# Patient Record
Sex: FEMALE | Race: WHITE | NOT HISPANIC OR LATINO
[De-identification: names, ages, dates, MRNs, and addresses within clinical notes are randomized per-mention and may not be internally consistent; named-entity substitution may affect disease eponyms.]

---

## 2017-07-12 ENCOUNTER — RESULT REVIEW (OUTPATIENT)
Age: 37
End: 2017-07-12

## 2018-11-01 ENCOUNTER — RESULT REVIEW (OUTPATIENT)
Age: 38
End: 2018-11-01

## 2020-10-20 ENCOUNTER — RESULT REVIEW (OUTPATIENT)
Age: 40
End: 2020-10-20

## 2024-03-04 ENCOUNTER — APPOINTMENT (OUTPATIENT)
Dept: OBGYN | Facility: CLINIC | Age: 44
End: 2024-03-04
Payer: COMMERCIAL

## 2024-03-04 VITALS — WEIGHT: 125 LBS | BODY MASS INDEX: 23.6 KG/M2 | HEIGHT: 61 IN

## 2024-03-04 DIAGNOSIS — N93.0 POSTCOITAL AND CONTACT BLEEDING: ICD-10-CM

## 2024-03-04 DIAGNOSIS — Z83.3 FAMILY HISTORY OF DIABETES MELLITUS: ICD-10-CM

## 2024-03-04 DIAGNOSIS — N84.0 POLYP OF CORPUS UTERI: ICD-10-CM

## 2024-03-04 DIAGNOSIS — N93.9 ABNORMAL UTERINE AND VAGINAL BLEEDING, UNSPECIFIED: ICD-10-CM

## 2024-03-04 DIAGNOSIS — Z83.42 FAMILY HISTORY OF FAMILIAL HYPERCHOLESTEROLEMIA: ICD-10-CM

## 2024-03-04 DIAGNOSIS — Z00.00 ENCOUNTER FOR GENERAL ADULT MEDICAL EXAMINATION W/OUT ABNORMAL FINDINGS: ICD-10-CM

## 2024-03-04 DIAGNOSIS — N92.0 EXCESSIVE AND FREQUENT MENSTRUATION WITH REGULAR CYCLE: ICD-10-CM

## 2024-03-04 DIAGNOSIS — N92.6 IRREGULAR MENSTRUATION, UNSPECIFIED: ICD-10-CM

## 2024-03-04 DIAGNOSIS — Z78.9 OTHER SPECIFIED HEALTH STATUS: ICD-10-CM

## 2024-03-04 DIAGNOSIS — Z80.1 FAMILY HISTORY OF MALIGNANT NEOPLASM OF TRACHEA, BRONCHUS AND LUNG: ICD-10-CM

## 2024-03-04 DIAGNOSIS — Z86.39 PERSONAL HISTORY OF OTHER ENDOCRINE, NUTRITIONAL AND METABOLIC DISEASE: ICD-10-CM

## 2024-03-04 PROCEDURE — 99204 OFFICE O/P NEW MOD 45 MIN: CPT

## 2024-03-04 NOTE — HISTORY OF PRESENT ILLNESS
[Patient reported mammogram was normal] : Patient reported mammogram was normal [Patient reported breast sonogram was normal] : Patient reported breast sonogram was normal [Patient reported PAP Smear was normal] : Patient reported PAP Smear was normal [FreeTextEntry1] : 43YO  LMP 2/10, menses q24-28d, some heavier than others and spots in between menses, not too painful. She has a Hx of endometrial polyp twice in the past. [BreastSonogramDate] : 3/23 [Mammogramdate] : 3/23 [PapSmeardate] : 3/23

## 2024-03-04 NOTE — PLAN
[FreeTextEntry1] : menomet and irreg cycles with Hx endometrial polyp Will likely require EmBx after U/S

## 2024-03-04 NOTE — PHYSICAL EXAM
[Chaperone Present] : A chaperone was present in the examining room during all aspects of the physical examination [Alert] : alert [Appropriately responsive] : appropriately responsive [No Acute Distress] : no acute distress [No Lymphadenopathy] : no lymphadenopathy [Regular Rate Rhythm] : regular rate rhythm [No Murmurs] : no murmurs [Clear to Auscultation B/L] : clear to auscultation bilaterally [Soft] : soft [Non-tender] : non-tender [No HSM] : No HSM [Non-distended] : non-distended [No Lesions] : no lesions [No Mass] : no mass [Oriented x3] : oriented x3 [Examination Of The Breasts] : a normal appearance [No Masses] : no breast masses were palpable [Labia Majora] : normal [Labia Minora] : normal [No Bleeding] : There was no active vaginal bleeding [Pink Rugae] : pink rugae [Normal] : normal [Normal Position] : in a normal position [Uterine Adnexae] : normal [Tenderness] : nontender

## 2024-03-05 LAB
HPV HIGH+LOW RISK DNA PNL CVX: NOT DETECTED
TSH SERPL-ACNC: 3.71 UIU/ML

## 2024-03-08 LAB — CYTOLOGY CVX/VAG DOC THIN PREP: NORMAL

## 2024-04-03 RX ORDER — NORETHINDRONE ACETATE 5 MG/1
5 TABLET ORAL
Qty: 40 | Refills: 0 | Status: ACTIVE | COMMUNITY
Start: 2024-04-03 | End: 1900-01-01

## 2024-11-19 ENCOUNTER — TRANSCRIPTION ENCOUNTER (OUTPATIENT)
Age: 44
End: 2024-11-19

## 2024-11-19 ENCOUNTER — APPOINTMENT (OUTPATIENT)
Dept: OBGYN | Facility: CLINIC | Age: 44
End: 2024-11-19
Payer: COMMERCIAL

## 2024-11-19 PROCEDURE — 99213 OFFICE O/P EST LOW 20 MIN: CPT | Mod: 25

## 2024-11-19 PROCEDURE — 76830 TRANSVAGINAL US NON-OB: CPT

## 2024-11-19 PROCEDURE — 76856 US EXAM PELVIC COMPLETE: CPT

## 2024-11-19 PROCEDURE — 36415 COLL VENOUS BLD VENIPUNCTURE: CPT

## 2024-11-26 ENCOUNTER — OUTPATIENT (OUTPATIENT)
Dept: OUTPATIENT SERVICES | Facility: HOSPITAL | Age: 44
LOS: 1 days | End: 2024-11-26
Payer: COMMERCIAL

## 2024-11-26 VITALS
TEMPERATURE: 99 F | RESPIRATION RATE: 18 BRPM | HEART RATE: 67 BPM | WEIGHT: 128.09 LBS | HEIGHT: 62 IN | DIASTOLIC BLOOD PRESSURE: 55 MMHG | OXYGEN SATURATION: 100 % | SYSTOLIC BLOOD PRESSURE: 97 MMHG

## 2024-11-26 DIAGNOSIS — Z01.818 ENCOUNTER FOR OTHER PREPROCEDURAL EXAMINATION: ICD-10-CM

## 2024-11-26 DIAGNOSIS — N92.0 EXCESSIVE AND FREQUENT MENSTRUATION WITH REGULAR CYCLE: ICD-10-CM

## 2024-11-26 DIAGNOSIS — N84.0 POLYP OF CORPUS UTERI: ICD-10-CM

## 2024-11-26 LAB
BLD GP AB SCN SERPL QL: NEGATIVE — SIGNIFICANT CHANGE UP
HCT VFR BLD CALC: 39.2 % — SIGNIFICANT CHANGE UP (ref 34.5–45)
HGB BLD-MCNC: 12.7 G/DL — SIGNIFICANT CHANGE UP (ref 11.5–15.5)
MCHC RBC-ENTMCNC: 27.8 PG — SIGNIFICANT CHANGE UP (ref 27–34)
MCHC RBC-ENTMCNC: 32.4 G/DL — SIGNIFICANT CHANGE UP (ref 32–36)
MCV RBC AUTO: 85.8 FL — SIGNIFICANT CHANGE UP (ref 80–100)
NRBC # BLD: 0 /100 WBCS — SIGNIFICANT CHANGE UP (ref 0–0)
PLATELET # BLD AUTO: 184 K/UL — SIGNIFICANT CHANGE UP (ref 150–400)
RBC # BLD: 4.57 M/UL — SIGNIFICANT CHANGE UP (ref 3.8–5.2)
RBC # FLD: 12.5 % — SIGNIFICANT CHANGE UP (ref 10.3–14.5)
RH IG SCN BLD-IMP: POSITIVE — SIGNIFICANT CHANGE UP
WBC # BLD: 7.53 K/UL — SIGNIFICANT CHANGE UP (ref 3.8–10.5)
WBC # FLD AUTO: 7.53 K/UL — SIGNIFICANT CHANGE UP (ref 3.8–10.5)

## 2024-11-26 PROCEDURE — G0463: CPT

## 2024-11-26 PROCEDURE — 86900 BLOOD TYPING SEROLOGIC ABO: CPT

## 2024-11-26 PROCEDURE — 86850 RBC ANTIBODY SCREEN: CPT

## 2024-11-26 PROCEDURE — 85027 COMPLETE CBC AUTOMATED: CPT

## 2024-11-26 PROCEDURE — 86901 BLOOD TYPING SEROLOGIC RH(D): CPT

## 2024-11-26 RX ORDER — 0.9 % SODIUM CHLORIDE 0.9 %
1000 INTRAVENOUS SOLUTION INTRAVENOUS
Refills: 0 | Status: DISCONTINUED | OUTPATIENT
Start: 2024-12-11 | End: 2024-12-25

## 2024-11-26 RX ORDER — SODIUM CHLORIDE 9 MG/ML
3 INJECTION, SOLUTION INTRAMUSCULAR; INTRAVENOUS; SUBCUTANEOUS EVERY 8 HOURS
Refills: 0 | Status: DISCONTINUED | OUTPATIENT
Start: 2024-12-11 | End: 2024-12-25

## 2024-11-26 NOTE — H&P PST ADULT - HEMATOLOGY/LYMPHATICS
Cardiology Clinic Note: Anirudh Richards MD    Date of Service: 6/28/2024      HPI:   Angeli Perry is a 47 year old female with past medical history significant for significant family history of CAD, elevated coronary artery calcium score and familial hypercholesterolemia  that comes for cardiac follow up.    Patient reports that since prior visit she has been feeling in her usual state of health. She denies any episode of chest pain, shortness of breath, palpitations, syncope or presyncope, lightheadedness or dizziness.  Denies orthopnea or lower extremity edema.  She is able to do all her activities of daily living without limitations.  She exercises regularly up to 3-4 times a week for 45 minutes daily. She reports compliance with all of her medications without side effects.   She reports has been struggling with weight management. She lost about 50 lbs with Ozempic, but has started to re-gain weight again. She is also experiencing some perimenopause symptoms which are being managed by her gynecologist. She was put on amlodipine 5 mg daily by her PCP for elevated BP while taking estrogen therapy. BP now is better control when she stopped taking birth control pills.       Cardiac and Relevant Medical History  Specialty Problems          Cardiology Problems    Familial hypercholesterolemia        Elevated coronary artery calcium score        Primary hypertension           Review of Systems   Review of Systems   Constitutional: Negative.  Negative for activity change, fatigue and fever.   HENT: Negative.  Negative for congestion, ear pain, rhinorrhea, sore throat and trouble swallowing.    Eyes: Negative.  Negative for visual disturbance.   Respiratory: Negative.  Negative for cough, shortness of breath and wheezing.    Cardiovascular: Negative.  Negative for chest pain, palpitations and leg swelling.   Gastrointestinal: Negative.  Negative for abdominal pain, diarrhea, nausea and vomiting.   Endocrine: Negative.     Genitourinary: Negative.  Negative for dysuria.   Musculoskeletal: Negative.  Negative for arthralgias and myalgias.   Skin: Negative.  Negative for color change and rash.   Allergic/Immunologic: Negative.    Neurological: Negative.  Negative for dizziness, syncope, weakness, numbness and headaches.   Hematological: Negative.    Psychiatric/Behavioral: Negative.         Past Medical History:   Diagnosis Date    Arthritis unknown    elbow, right knee    Atypical squamous cell changes of undetermined significance (ASCUS) on cervical cytology with negative high risk human papilloma virus (HPV) test result 2013    had normal bx- 2013    Carpal tunnel syndrome on both sides     CIDP (chronic inflammatory demyelinating polyneuropathy)  (CMD)     Colon polyp     tubular adenoma    Dermatitis 02/22/2016    right hand- remitted in 2016    Ectopic pregnancy (CMD) 2004    Genital herpes     GERD (gastroesophageal reflux disease) Unk    Hypercholesteremia     Neuromuscular disorder  (CMD) 03/2012    CIDP    Obesity (BMI 30-39.9) 09/04/2020    Rash of neck 10/18/2021       Past Surgical History:   Procedure Laterality Date    Carpal tunnel release Left 01/04/2024    Colonoscopy w biopsy  04/25/2023    tubular adenoma, repeat 1 year due to suboptimal prep    Ectopic pregnancy surgery Left 2012    Laparoscopic Excision Of Ectopic Preg & Salpingectomy     Esophagogastroduodenoscopy  2021    Hysteroscopy      Hysteroscopy W/ Insert Of Device To Occlude Fallopian Tubes    Ovary surgery  05/2010    Oviductal Surgery Tubal Occlusion By Device    Pelvic laparoscopy      ectopic    Perineorrhaphy      perineorrhaphy and sacrospinous ligament fixation    Rectal surgery  2010    Excision Of Rectal Procidentia Perineal Approach    Repair of rectocele      Tubal ligation      adiana       Family History   Problem Relation Age of Onset    Osteoporosis Mother 57    Hypertension Mother     High blood pressure Mother     Hyperlipidemia Mother      Colon Polyps Mother     Myocardial Infarction Father     COPD Father     Diabetes Father     Other Father         Pulmonary Hypertension    High blood pressure Father     Hyperlipidemia Father     Patient is unaware of any medical problems Brother     Cancer, Breast Maternal Aunt 76    Cancer, Breast Paternal Aunt 59    Cancer, Throat Maternal Grandmother     Cancer, Colon Neg Hx     Cancer, Ovarian Neg Hx     Clotting Disorder Neg Hx     Cancer, Endometrial Neg Hx        Social History     Tobacco Use    Smoking status: Never     Passive exposure: Never    Smokeless tobacco: Never   Vaping Use    Vaping status: never used   Substance Use Topics    Alcohol use: Yes     Comment: Socially, 1-2x/month    Drug use: No       ALLERGIES:  No Known Allergies    Current Outpatient Medications   Medication Sig Dispense Refill    diclofenac (VOLTAREN) 50 MG EC tablet Take 50 mg by mouth in the morning and 50 mg in the evening.      methylPREDNISolone (MEDROL DOSEPAK) 4 MG tablet TAKE 6 TABLETS ON DAY 1 AS DIRECTED ON PACKAGE AND DECREASE BY 1 TAB EACH DAY FOR A TOTAL OF 6 DAYS      amLODIPine (NORVASC) 5 MG tablet TAKE 1 TABLET BY MOUTH EVERY DAY 90 tablet 1    triamcinolone (ARISTOCORT) 0.1 % cream Apply 1 Application topically as needed (psoriasis).      clobetasol (TEMOVATE) 0.05 % topical solution Apply 1 Application topically as needed.      Ozempic, 0.25 or 0.5 MG/DOSE, 2 MG/3ML Solution Pen-injector Inject 0.25 mg into the skin every 7 weeks.      CALCIUM CITRATE-VITAMIN D PO       atorvastatin (LIPITOR) 80 MG tablet TAKE 1 TABLET BY MOUTH EVERY DAY 90 tablet 3    gabapentin (NEURONTIN) 400 MG capsule Take 1 capsule by mouth in the morning and 1 capsule in the evening. 180 capsule 3    ibuprofen (MOTRIN) 600 MG tablet Take 1 tablet by mouth every 6 hours as needed.      cetirizine (ZyrTEC) 10 MG tablet Take 10 mg by mouth daily.      Immune Globulin, Human, (IMMUNE GLOBULIN, SUCROSE FREE,) 5 GM/50ML Solution Give  35gm for two days every 8 weeks. (Patient taking differently: Give 35gm for two days every 10 weeks.) 350 mL 11    Multiple Vitamins-Minerals (MULTIVITAMIN ADULT PO)       acetaminophen (TYLENOL) 325 MG tablet Take 625mg of tylenol prior to monthly IVIG infusion. 2 tablet 11    Fiber, Guar Gum, Chew Tab       fluticasone (FLONASE) 50 MCG/ACT nasal spray        No current facility-administered medications for this visit.         Objective:    Physical Exam:   Visit Vitals  BP 97/64 (BP Location: LUE - Left upper extremity, Patient Position: Sitting, Cuff Size: Regular) Comment (Cuff Size): Long   Pulse 75   Ht 5' 8\" (1.727 m)   Wt 86.7 kg (191 lb 4 oz)   LMP 05/24/2024 (Exact Date)   SpO2 99%   BMI 29.08 kg/m²         Wt Readings from Last 4 Encounters:   06/28/24 86.7 kg (191 lb 4 oz)   06/13/24 86.4 kg (190 lb 7.6 oz)   06/12/24 86.4 kg (190 lb 9.4 oz)   06/10/24 86.6 kg (191 lb)         Physical Exam   Constitutional: She appears healthy. No distress.   Vital signs reviewed   HENT:   Mouth/Throat: Oropharynx is clear.   Eyes: Conjunctivae are normal.   Neck: Thyroid normal.   Cardiovascular: Normal rate, regular rhythm, S1 normal, S2 normal, normal heart sounds, intact distal pulses and normal pulses.   Pulmonary/Chest: Effort normal and breath sounds normal. She exhibits no tenderness.   Abdominal: Soft. Bowel sounds are normal.   Musculoskeletal:         General: Normal range of motion.      Cervical back: Normal range of motion and neck supple.   Neurological: She is alert and oriented to person, place, and time. She has normal motor skills. Gait normal.   Skin: Skin is warm and dry. No rash noted. No cyanosis. Nails show no clubbing.         Labs:  Recent Labs   Lab 06/05/24  0848 02/26/24  0810   Cholesterol 155 177   HDL 53 53   Triglycerides 132 116   CALCLDL 76 101   Non-HDL Cholesterol 102 124       Recent Labs   Lab 06/05/24  0848 02/26/24  0810   Sodium 141 140   Chloride 107 109   BUN 10 11   Potassium  4.1 4.1   Glucose 98 118*   Creatinine 0.72 0.63   Calcium 9.2 9.1       No results for input(s): \"WBC\", \"RBC\", \"HGB\", \"HCT\", \"MCV\", \"MCHC\", \"RDWCV\", \"PLT\", \"TLYMPH\" in the last 8765 hours.    Recent Labs   Lab 02/26/24  0810   GPT 22        No results for input(s): \"NTPROB\" in the last 8765 hours.    The 10-year ASCVD risk score (Rebecca LITTLEJOHN, et al., 2019) is: 0.5%    Values used to calculate the score:      Age: 47 years      Sex: Female      Is Non- : No      Diabetic: No      Tobacco smoker: No      Systolic Blood Pressure: 97 mmHg      Is BP treated: Yes      HDL Cholesterol: 53 mg/dL      Total Cholesterol: 155 mg/dL  Imaging:  No results found.  No results found for this or any previous visit.    No results found for this or any previous visit.    No results found for this or any previous visit.    No results found for this or any previous visit.        Assessment    Elevated coronary artery calcium score  EKG in the office at previous visit was normal.  Today 6/28/24 ECG shows NSR with no ischemic changes.   1/28/2019 coronary calcium score 19; RCA 18, other branch 1.  Although a score of 19 does not suggest that she has obstructive CAD, the mere presence of coronary calcium of this degree at her age suggests increased risk as she is at the 90th percentile.  She needs aggressive risk factor modification, particularly in the context of a very strong family history.    -LDL should be aggressively targeted to 60 and blood pressure should be maintained meticulously below 130/80.  97/64 in the office today  Continue high intensity statin and amlodipine 5 mg daily.   If patient is to start in HRT by gynecologist, suggest checking lipid panel every 6 months, and if LDL not at goal, could consider adding Zetia to her regimen.         Familial hypercholesterolemia  11/5/2022  TG 88 HDL 40 LDL 89  2/18/2023   HDL 51 LDL 62  6/5/24   HDL 53 LDL 76  Continue  atorvastatin at 80 mg as her LDL appears well controlled and close to goal.   If patient is to start in HRT by gynecologist, suggest checking lipid panel every 6 months, and if LDL not at goal, could consider adding Zetia to her regimen.      Prediabetes  On metformin and Ozempic.     Obesity (BMI 30-39.9)  She has lost a significant amount of weight and has improved her BMI significantly. I encouraged her to maintain intensive lifestyle modification.  This includes diet, exercise, and weight control.     Orthostatic hypotension  -The patient complained of what sounds like classic orthostatic hypotension when going from seated to laying down to standing.  -Counseled on counter maneuvers to combat this     Chronic inflammatory demyelinating neuropathy   -Undergoing IVIG infusions        Orders Placed This Encounter    Comprehensive Metabolic Panel    Lipid Panel With Reflex    Electrocardiogram 12-Lead       Recommendations  1. Continue current medication regimen.  2. Encourage lifestyle modifications including regular exercise, heart healthy diet, weight loss.   3. Check lipid panel at least twice a year if patient is started in HRT.   4. Follow up in cardiology clinic in 1 year.     Discussed with attending physician Dr. Susie Garcia MD  PGY-5 Cardiology Fellow   [facility]  Please contact via [email or web address]    Attending: Patient seen and examined.  Data above is entirely accurate.  I agree.  My recommendations are incorporated above.  Patient appears very well compensated and has no specific cardiac complaints.  Continue with lifestyle modification recommendation as outlined above.  I will continue to see her annually.    Greater than 50% of the time spent was counseling the patient on the above problems.  Total time spent was 35 minutes.    Anirudh Richards MD     negative

## 2024-11-26 NOTE — H&P PST ADULT - ASSESSMENT
DASI Score:8  DASI Activity:  Cardio 4-5x weekly    able to go up one flight of stairs or walk 1-2 blocks without difficulty  Loose or removable teeth: denies

## 2024-11-26 NOTE — H&P PST ADULT - NSICDXPASTSURGICALHX_GEN_ALL_CORE_FT
PAST SURGICAL HISTORY:   delivery delivered X 2    S/P dilatation and curettage to remove endometrial polyp 2006    S/P dilation and curettage 2011

## 2024-11-26 NOTE — H&P PST ADULT - HISTORY OF PRESENT ILLNESS
This is a 44 year old female        Presenting to PST for scheduled D&C, hysteroscopy w/ myosure, resection of endometrial polyp w/ anshul on 12/11/24 with Dr. Keita.  This is a 44 year old female with no significant past medical history, not taking any medications. Reports having years of heavy, painful menses. Presenting to [facility] for scheduled D&C, hysteroscopy w/ myosure, resection of endometrial polyp w/ anshul on 12/11/24 with Dr. Keita.

## 2024-11-26 NOTE — H&P PST ADULT - PROBLEM SELECTOR PLAN 1
Preop instructions given and pt verbalized understanding  Labs CBC T&S performed in PST  ABO  and UCG DOS

## 2024-12-11 ENCOUNTER — APPOINTMENT (OUTPATIENT)
Dept: OBGYN | Facility: HOSPITAL | Age: 44
End: 2024-12-11

## 2024-12-11 ENCOUNTER — OUTPATIENT (OUTPATIENT)
Dept: OUTPATIENT SERVICES | Facility: HOSPITAL | Age: 44
LOS: 1 days | End: 2024-12-11
Payer: COMMERCIAL

## 2024-12-11 ENCOUNTER — TRANSCRIPTION ENCOUNTER (OUTPATIENT)
Age: 44
End: 2024-12-11

## 2024-12-11 VITALS
OXYGEN SATURATION: 99 % | SYSTOLIC BLOOD PRESSURE: 101 MMHG | DIASTOLIC BLOOD PRESSURE: 56 MMHG | RESPIRATION RATE: 18 BRPM | HEART RATE: 62 BPM

## 2024-12-11 VITALS
WEIGHT: 128.09 LBS | HEART RATE: 60 BPM | RESPIRATION RATE: 15 BRPM | HEIGHT: 62 IN | DIASTOLIC BLOOD PRESSURE: 58 MMHG | OXYGEN SATURATION: 100 % | SYSTOLIC BLOOD PRESSURE: 90 MMHG | TEMPERATURE: 97 F

## 2024-12-11 DIAGNOSIS — N92.0 EXCESSIVE AND FREQUENT MENSTRUATION WITH REGULAR CYCLE: ICD-10-CM

## 2024-12-11 DIAGNOSIS — N84.0 POLYP OF CORPUS UTERI: ICD-10-CM

## 2024-12-11 PROCEDURE — 58563 HYSTEROSCOPY ABLATION: CPT

## 2024-12-11 PROCEDURE — C1889: CPT

## 2024-12-11 PROCEDURE — C1782: CPT

## 2024-12-11 PROCEDURE — 88305 TISSUE EXAM BY PATHOLOGIST: CPT | Mod: 26

## 2024-12-11 PROCEDURE — 58353 ENDOMETR ABLATE THERMAL: CPT

## 2024-12-11 PROCEDURE — 88305 TISSUE EXAM BY PATHOLOGIST: CPT

## 2024-12-11 DEVICE — NOVASURE V5: Type: IMPLANTABLE DEVICE | Status: FUNCTIONAL

## 2024-12-11 DEVICE — MYOSURE TISSUE REMOVAL DEVICE REACH: Type: IMPLANTABLE DEVICE | Status: FUNCTIONAL

## 2024-12-11 RX ORDER — LIDOCAINE HCL 20 MG/ML
0.2 VIAL (ML) INJECTION ONCE
Refills: 0 | Status: COMPLETED | OUTPATIENT
Start: 2024-12-11 | End: 2024-12-11

## 2024-12-11 RX ORDER — FENTANYL 12 UG/H
25 PATCH, EXTENDED RELEASE TRANSDERMAL
Refills: 0 | Status: DISCONTINUED | OUTPATIENT
Start: 2024-12-11 | End: 2024-12-11

## 2024-12-11 RX ORDER — ONDANSETRON HYDROCHLORIDE 4 MG/1
4 TABLET, FILM COATED ORAL ONCE
Refills: 0 | Status: DISCONTINUED | OUTPATIENT
Start: 2024-12-11 | End: 2024-12-25

## 2024-12-11 RX ADMIN — SODIUM CHLORIDE 3 MILLILITER(S): 9 INJECTION, SOLUTION INTRAMUSCULAR; INTRAVENOUS; SUBCUTANEOUS at 09:29

## 2024-12-11 RX ADMIN — Medication 100 MILLILITER(S): at 09:30

## 2024-12-11 NOTE — PACU DISCHARGE NOTE - NS MD DISCHARGE NOTE DISCHARGE
RX clarified and resent to pharm
Home
Pt demonstrated decreased strength, balance, ROM, vision and cognition and limited by pain impacting pt's ability to participate in functional mobility and ADLs.

## 2024-12-11 NOTE — BRIEF OPERATIVE NOTE - NSICDXBRIEFPREOP_GEN_ALL_CORE_FT
PRE-OP DIAGNOSIS:  Menometrorrhagia 11-Dec-2024 11:54:40  Pb Keita  Endometrial polyp 11-Dec-2024 11:54:49  Pb Keita

## 2024-12-11 NOTE — BRIEF OPERATIVE NOTE - NSICDXBRIEFPROCEDURE_GEN_ALL_CORE_FT
PROCEDURES:  Polypectomy, hysteroscopic, uterus 11-Dec-2024 11:54:10  Pb Keita  D&C (dilatation and curettage, scraping of uterus) 11-Dec-2024 11:54:21  Pb Keita  NovaSure endometrial ablation 11-Dec-2024 11:54:27  Pb Keita

## 2024-12-11 NOTE — ASU DISCHARGE PLAN (ADULT/PEDIATRIC) - NURSING INSTRUCTIONS
You can take Tylenol (Acetaminophen) every 6 hours and Motrin (Ibuprofen) every 6 hours, as needed. You may alternate these medications such that you take one or the other every 3 hours for around the clock pain coverage. Do not exceed 4000mg of Tylenol (Acetaminophen) daily. The next time you can take Tylenol and/or Ibuprofen is at 530PM, if needed for pain and no contraindications.

## 2024-12-11 NOTE — ASU PREOP CHECKLIST - ANTIBIOTIC
Is This A New Presentation, Or A Follow-Up?: Skin Lesions How Severe Is Your Skin Lesion?: mild Have Your Skin Lesions Been Treated?: not been treated n/a

## 2024-12-11 NOTE — ASU DISCHARGE PLAN (ADULT/PEDIATRIC) - CARE PROVIDER_API CALL
Pb Keita  Obstetrics and Gynecology  [address] [place] [address], [address]  [place] [address]  Phone: [phone]  Fax: [phone]  Follow Up Time:

## 2024-12-11 NOTE — ASU DISCHARGE PLAN (ADULT/PEDIATRIC) - FINANCIAL ASSISTANCE
[facility] provides services at a reduced cost to those who are determined to be eligible through [facility]’s financial assistance program. Information regarding [facility]’s financial assistance program can be found by going to [email or web address] or by calling [phone].

## 2024-12-11 NOTE — BRIEF OPERATIVE NOTE - NSICDXBRIEFPOSTOP_GEN_ALL_CORE_FT
POST-OP DIAGNOSIS:  Menometrorrhagia 11-Dec-2024 11:54:59  Pb Keita  Endometrial polyp 11-Dec-2024 11:55:08  Pb Keita

## 2024-12-11 NOTE — ASU PREOP CHECKLIST - BOWEL PREP
CMS intact to lower extremities  Dressing site of _mid back____ C/D/I  Report to given to __Jim__ recovery RN  Procedure tolerated well and in stable condition to recovery room   
Procedure to be done Bilateral thoracic cooled radiofrequency ablation  See printout for procedural vitals  Grounding pad applied to lower leg. Skin dry and intact    
n/a

## 2024-12-13 LAB — SURGICAL PATHOLOGY STUDY: SIGNIFICANT CHANGE UP

## 2024-12-31 ENCOUNTER — APPOINTMENT (OUTPATIENT)
Dept: OBGYN | Facility: CLINIC | Age: 44
End: 2024-12-31
Payer: COMMERCIAL

## 2024-12-31 PROCEDURE — 99212 OFFICE O/P EST SF 10 MIN: CPT

## (undated) DEVICE — MYOSURE SCOPE SEAL

## (undated) DEVICE — GLV 7.5 PROTEXIS (WHITE)

## (undated) DEVICE — POSITIONER PATIENT SAFETY STRAP 3X60"

## (undated) DEVICE — DRAPE 1/2 SHEET 40X57"

## (undated) DEVICE — DRSG TELFA 3 X 8

## (undated) DEVICE — SOL IRR POUR NS 0.9% 500ML

## (undated) DEVICE — PACK LITHOTOMY

## (undated) DEVICE — WARMING BLANKET UPPER ADULT

## (undated) DEVICE — FLUENT FMS PROCEDURE KIT

## (undated) DEVICE — DRAPE LIGHT HANDLE COVER (GREEN)

## (undated) DEVICE — SOL IRR BAG NS 0.9% 3000ML

## (undated) DEVICE — POSITIONER FOAM EGG CRATE ULNAR 2PCS (PINK)

## (undated) DEVICE — PREP BETADINE KIT

## (undated) DEVICE — MARKING PEN W RULER

## (undated) DEVICE — NOVASURE CO2 CARTRIDGE